# Patient Record
Sex: FEMALE | Race: OTHER
[De-identification: names, ages, dates, MRNs, and addresses within clinical notes are randomized per-mention and may not be internally consistent; named-entity substitution may affect disease eponyms.]

---

## 2018-12-30 ENCOUNTER — HOSPITAL ENCOUNTER (EMERGENCY)
Dept: HOSPITAL 8 - ED | Age: 53
Discharge: HOME | End: 2018-12-30
Payer: SELF-PAY

## 2018-12-30 VITALS — SYSTOLIC BLOOD PRESSURE: 108 MMHG | DIASTOLIC BLOOD PRESSURE: 55 MMHG

## 2018-12-30 VITALS — HEIGHT: 60 IN | BODY MASS INDEX: 26.88 KG/M2 | WEIGHT: 136.91 LBS

## 2018-12-30 DIAGNOSIS — E11.65: ICD-10-CM

## 2018-12-30 DIAGNOSIS — I10: ICD-10-CM

## 2018-12-30 DIAGNOSIS — M79.10: ICD-10-CM

## 2018-12-30 DIAGNOSIS — B34.9: Primary | ICD-10-CM

## 2018-12-30 DIAGNOSIS — E78.5: ICD-10-CM

## 2018-12-30 PROCEDURE — 99284 EMERGENCY DEPT VISIT MOD MDM: CPT

## 2018-12-30 PROCEDURE — 71045 X-RAY EXAM CHEST 1 VIEW: CPT

## 2018-12-30 PROCEDURE — 87400 INFLUENZA A/B EACH AG IA: CPT

## 2018-12-30 NOTE — NUR
PT BIBG SON WITH 2 DAYS HX OF FEVERS AND COUGH. PT'S SON HAS HAD SIMILAR 
SYMPTOMS OVER THE LAST 4 DAYS.  CHART UP FOR MD.

## 2020-11-20 ENCOUNTER — HOSPITAL ENCOUNTER (EMERGENCY)
Dept: HOSPITAL 8 - ED | Age: 55
Discharge: HOME | End: 2020-11-20
Payer: COMMERCIAL

## 2020-11-20 VITALS — SYSTOLIC BLOOD PRESSURE: 119 MMHG | DIASTOLIC BLOOD PRESSURE: 75 MMHG

## 2020-11-20 VITALS — WEIGHT: 136.25 LBS | BODY MASS INDEX: 25.72 KG/M2 | HEIGHT: 61 IN

## 2020-11-20 DIAGNOSIS — J02.9: ICD-10-CM

## 2020-11-20 DIAGNOSIS — R53.1: ICD-10-CM

## 2020-11-20 DIAGNOSIS — R05: ICD-10-CM

## 2020-11-20 DIAGNOSIS — R51.9: ICD-10-CM

## 2020-11-20 DIAGNOSIS — E11.9: ICD-10-CM

## 2020-11-20 DIAGNOSIS — B43.9: Primary | ICD-10-CM

## 2020-11-20 DIAGNOSIS — M79.10: ICD-10-CM

## 2020-11-20 DIAGNOSIS — E78.5: ICD-10-CM

## 2020-11-20 DIAGNOSIS — I10: ICD-10-CM

## 2020-11-20 DIAGNOSIS — R42: ICD-10-CM

## 2020-11-20 LAB
ALBUMIN SERPL-MCNC: 3.6 G/DL (ref 3.4–5)
ANION GAP SERPL CALC-SCNC: 4 MMOL/L (ref 5–15)
BASOPHILS # BLD AUTO: 0 X10^3/UL (ref 0–0.1)
BASOPHILS NFR BLD AUTO: 0 % (ref 0–1)
CALCIUM SERPL-MCNC: 9.6 MG/DL (ref 8.5–10.1)
CHLORIDE SERPL-SCNC: 103 MMOL/L (ref 98–107)
CREAT SERPL-MCNC: 0.8 MG/DL (ref 0.55–1.02)
EOSINOPHIL # BLD AUTO: 0.1 X10^3/UL (ref 0–0.4)
EOSINOPHIL NFR BLD AUTO: 1 % (ref 1–7)
ERYTHROCYTE [DISTWIDTH] IN BLOOD BY AUTOMATED COUNT: 12.4 % (ref 9.6–15.2)
LYMPHOCYTES # BLD AUTO: 0.8 X10^3/UL (ref 1–3.4)
LYMPHOCYTES NFR BLD AUTO: 13 % (ref 22–44)
MCH RBC QN AUTO: 29.1 PG (ref 27–34.8)
MCHC RBC AUTO-ENTMCNC: 33.8 G/DL (ref 32.4–35.8)
MD: NO
MICROSCOPIC: (no result)
MONOCYTES # BLD AUTO: 0.4 X10^3/UL (ref 0.2–0.8)
MONOCYTES NFR BLD AUTO: 7 % (ref 2–9)
NEUTROPHILS # BLD AUTO: 4.6 X10^3/UL (ref 1.8–6.8)
NEUTROPHILS NFR BLD AUTO: 79 % (ref 42–75)
PLATELET # BLD AUTO: 290 X10^3/UL (ref 130–400)
PMV BLD AUTO: 9.5 FL (ref 7.4–10.4)
RBC # BLD AUTO: 4 X10^6/UL (ref 3.82–5.3)

## 2020-11-20 PROCEDURE — 36415 COLL VENOUS BLD VENIPUNCTURE: CPT

## 2020-11-20 PROCEDURE — 87086 URINE CULTURE/COLONY COUNT: CPT

## 2020-11-20 PROCEDURE — 71045 X-RAY EXAM CHEST 1 VIEW: CPT

## 2020-11-20 PROCEDURE — 81001 URINALYSIS AUTO W/SCOPE: CPT

## 2020-11-20 PROCEDURE — 99285 EMERGENCY DEPT VISIT HI MDM: CPT

## 2020-11-20 PROCEDURE — 80048 BASIC METABOLIC PNL TOTAL CA: CPT

## 2020-11-20 PROCEDURE — 82040 ASSAY OF SERUM ALBUMIN: CPT

## 2020-11-20 PROCEDURE — 93005 ELECTROCARDIOGRAM TRACING: CPT

## 2020-11-20 PROCEDURE — 85025 COMPLETE CBC W/AUTO DIFF WBC: CPT

## 2020-11-20 NOTE — NUR
*TITI CASTRO USED FOR TRANSLATION* (BLUE PHONE NOT WORKING AND [employer] CANNOT 
BE USED IN COVID ROOMS. )

PATIENT WALKED BACK FROM [facility] WITH CHIEF C/O WEAKNESS, HA X2 WEEKS. PATIENT 
REPORTS HAVING A HARD TIME GETTING OUT OF BED BY HERSELF, SHE REPORTS N/V/D, 
BODY AHCES, AND DIZZINESS. PATIENT STATES WHEN SHE STANDS UP SHE FEELS LIKE SHE 
IS GOING TO FALL FORWARD. PATIENT REPORTS A MILD COUGH. COVID TESTED LAST 
SATURDAY AT [facility] [place], BUT PATIENT REPORTS SHE HAS NOT 
RECEIVED THE RESULTS YET.

ERMD AT BEDSIDE FOR EVALUATION, NO SIGNS OF ACUTE DISTRESS, CONNECTED TO 
CARDIAC MONITOR.

## 2020-11-20 NOTE — NUR
PATIENT RESTING IN GURNEY WITH EYES CLOSED, RESPIRATIONS EVEN AND UNLABORED, 
CONNECTED TO CARDIAC MONITOR, SIDE RAILS UP X2, CALL LIGHT WITHIN REACH.